# Patient Record
Sex: FEMALE | URBAN - METROPOLITAN AREA
[De-identification: names, ages, dates, MRNs, and addresses within clinical notes are randomized per-mention and may not be internally consistent; named-entity substitution may affect disease eponyms.]

---

## 2020-06-02 ENCOUNTER — APPOINTMENT (RX ONLY)
Dept: URBAN - METROPOLITAN AREA CLINIC 377 | Facility: CLINIC | Age: 55
Setting detail: DERMATOLOGY
End: 2020-06-02

## 2020-06-02 VITALS — TEMPERATURE: 96.9 F

## 2020-06-02 DIAGNOSIS — L40.8 OTHER PSORIASIS: ICD-10-CM

## 2020-06-02 PROCEDURE — ? PRESCRIPTION

## 2020-06-02 PROCEDURE — 99203 OFFICE O/P NEW LOW 30 MIN: CPT

## 2020-06-02 PROCEDURE — ? ADDITIONAL NOTES

## 2020-06-02 PROCEDURE — ? COUNSELING

## 2020-06-02 PROCEDURE — ? ORDER TESTS

## 2020-06-02 RX ORDER — KETOCONAZOLE 20 MG/G
CREAM TOPICAL
Qty: 2 | Refills: 3 | Status: ERX | COMMUNITY
Start: 2020-06-02

## 2020-06-02 RX ORDER — TRIAMCINOLONE ACETONIDE 1 MG/G
CREAM TOPICAL
Qty: 1 | Refills: 0 | Status: ERX | COMMUNITY
Start: 2020-06-02

## 2020-06-02 RX ADMIN — TRIAMCINOLONE ACETONIDE: 1 CREAM TOPICAL at 00:00

## 2020-06-02 RX ADMIN — KETOCONAZOLE: 20 CREAM TOPICAL at 00:00

## 2020-06-02 ASSESSMENT — LOCATION DETAILED DESCRIPTION DERM
LOCATION DETAILED: RIGHT RIB CAGE
LOCATION DETAILED: RIGHT ANTERIOR PROXIMAL THIGH
LOCATION DETAILED: RIGHT INFRAMAMMARY CREASE (INNER QUADRANT)
LOCATION DETAILED: LEFT ANTERIOR PROXIMAL THIGH
LOCATION DETAILED: LEFT MEDIAL BREAST 7-8:00 REGION
LOCATION DETAILED: RIGHT MEDIAL BREAST 4-5:00 REGION
LOCATION DETAILED: RIGHT BUTTOCK
LOCATION DETAILED: GENITALIA
LOCATION DETAILED: RIGHT INGUINAL CREASE
LOCATION DETAILED: LEFT RIB CAGE
LOCATION DETAILED: LEFT MEDIAL BREAST 8-9:00 REGION
LOCATION DETAILED: LEFT INFRAMAMMARY CREASE (INNER QUADRANT)

## 2020-06-02 ASSESSMENT — PGA PSORIASIS: PGA PSORIASIS 2020: MODERATE

## 2020-06-02 ASSESSMENT — LOCATION SIMPLE DESCRIPTION DERM
LOCATION SIMPLE: LEFT BREAST
LOCATION SIMPLE: ABDOMEN
LOCATION SIMPLE: LEFT THIGH
LOCATION SIMPLE: RIGHT THIGH
LOCATION SIMPLE: RIGHT BUTTOCK
LOCATION SIMPLE: GENITALIA
LOCATION SIMPLE: RIGHT BREAST
LOCATION SIMPLE: GROIN

## 2020-06-02 ASSESSMENT — LOCATION ZONE DERM
LOCATION ZONE: TRUNK
LOCATION ZONE: LEG

## 2020-06-02 ASSESSMENT — BSA PSORIASIS: % BODY COVERED IN PSORIASIS: 45

## 2020-06-02 NOTE — HPI: RASH
What Type Of Note Output Would You Prefer (Optional)?: Bullet Format
Is The Patient Presenting As Previously Scheduled?: Yes
How Severe Is Your Rash?: moderate
Is This A New Presentation, Or A Follow-Up?: Rash
Additional History: Pt states rash got worse in nursing home. Pt advises she has psoriasis.

## 2020-06-02 NOTE — PROCEDURE: ADDITIONAL NOTES
Detail Level: Simple
Additional Notes: Patient has history of NonHodgkins Lymphoma, is to consult with Oncologist regarding initiating biologic therapy. Cosentyx vs Humira vs other. Will start with topical therapy and recheck in 1 month. Patient to get labwork done prior to next appointment and have written consent from Oncologist regarding biologic therapy. All questions answered.

## 2021-06-08 ENCOUNTER — APPOINTMENT (RX ONLY)
Dept: URBAN - METROPOLITAN AREA CLINIC 174 | Facility: CLINIC | Age: 56
Setting detail: DERMATOLOGY
End: 2021-06-08

## 2021-06-08 DIAGNOSIS — L40.0 PSORIASIS VULGARIS: ICD-10-CM | Status: INADEQUATELY CONTROLLED

## 2021-06-08 PROCEDURE — ? PRESCRIPTION

## 2021-06-08 PROCEDURE — ? COUNSELING

## 2021-06-08 PROCEDURE — ? MEDICATION COUNSELING

## 2021-06-08 PROCEDURE — ? PRESCRIPTION MEDICATION MANAGEMENT

## 2021-06-08 PROCEDURE — 99214 OFFICE O/P EST MOD 30 MIN: CPT

## 2021-06-08 RX ORDER — FLUOCINONIDE 0.5 MG/G
CREAM TOPICAL
Qty: 1 | Refills: 3 | Status: ERX | COMMUNITY
Start: 2021-06-08

## 2021-06-08 RX ADMIN — FLUOCINONIDE: 0.5 CREAM TOPICAL at 00:00

## 2021-06-08 ASSESSMENT — LOCATION SIMPLE DESCRIPTION DERM
LOCATION SIMPLE: RIGHT BUTTOCK
LOCATION SIMPLE: RIGHT POSTERIOR THIGH
LOCATION SIMPLE: LEFT CALF
LOCATION SIMPLE: RIGHT BREAST
LOCATION SIMPLE: LEFT BREAST
LOCATION SIMPLE: LEFT UPPER BACK
LOCATION SIMPLE: LEFT PRETIBIAL REGION
LOCATION SIMPLE: RIGHT CALF
LOCATION SIMPLE: ABDOMEN
LOCATION SIMPLE: LEFT BUTTOCK
LOCATION SIMPLE: LEFT POSTERIOR THIGH

## 2021-06-08 ASSESSMENT — LOCATION DETAILED DESCRIPTION DERM
LOCATION DETAILED: LEFT MEDIAL UPPER BACK
LOCATION DETAILED: LEFT PROXIMAL PRETIBIAL REGION
LOCATION DETAILED: RIGHT DISTAL POSTERIOR THIGH
LOCATION DETAILED: LEFT MEDIAL BREAST 6-7:00 REGION
LOCATION DETAILED: RIGHT RIB CAGE
LOCATION DETAILED: RIGHT BUTTOCK
LOCATION DETAILED: PERIUMBILICAL SKIN
LOCATION DETAILED: LEFT PROXIMAL CALF
LOCATION DETAILED: RIGHT PROXIMAL CALF
LOCATION DETAILED: LEFT BUTTOCK
LOCATION DETAILED: LEFT LATERAL ABDOMEN
LOCATION DETAILED: LEFT RIB CAGE
LOCATION DETAILED: LEFT DISTAL POSTERIOR THIGH
LOCATION DETAILED: RIGHT MEDIAL BREAST 5-6:00 REGION

## 2021-06-08 ASSESSMENT — LOCATION ZONE DERM
LOCATION ZONE: TRUNK
LOCATION ZONE: LEG

## 2021-06-08 NOTE — PROCEDURE: MEDICATION COUNSELING
Xelanjaliz Pregnancy And Lactation Text: This medication is Pregnancy Category D and is not considered safe during pregnancy.  The risk during breast feeding is also uncertain.

## 2021-06-08 NOTE — PROCEDURE: MEDICATION COUNSELING
no previous reaction Xolair Pregnancy And Lactation Text: This medication is Pregnancy Category B and is considered safe during pregnancy. This medication is excreted in breast milk.

## 2021-06-08 NOTE — PROCEDURE: PRESCRIPTION MEDICATION MANAGEMENT
30-Jul-2020 10:15
Render In Strict Bullet Format?: No
Plan: - will use this sparingly-fluo\\n-culture if any worsening\\n-general lubrication advised\\n-pt will need to get letter from transplant team and onc before any systemic agents--she was informed
Detail Level: Zone

## 2023-04-02 ENCOUNTER — APPOINTMENT (OUTPATIENT)
Dept: GENERAL RADIOLOGY | Age: 58
End: 2023-04-02
Payer: COMMERCIAL

## 2023-04-02 ENCOUNTER — APPOINTMENT (OUTPATIENT)
Dept: CT IMAGING | Age: 58
End: 2023-04-02
Payer: COMMERCIAL

## 2023-04-02 ENCOUNTER — HOSPITAL ENCOUNTER (EMERGENCY)
Age: 58
Discharge: HOME | End: 2023-04-02
Attending: EMERGENCY MEDICINE
Payer: COMMERCIAL

## 2023-04-02 VITALS
OXYGEN SATURATION: 96 % | DIASTOLIC BLOOD PRESSURE: 68 MMHG | BODY MASS INDEX: 38.21 KG/M2 | WEIGHT: 258 LBS | HEIGHT: 69 IN | SYSTOLIC BLOOD PRESSURE: 131 MMHG | RESPIRATION RATE: 14 BRPM | TEMPERATURE: 97.6 F | HEART RATE: 89 BPM

## 2023-04-02 DIAGNOSIS — E83.42 HYPOMAGNESEMIA: ICD-10-CM

## 2023-04-02 DIAGNOSIS — N30.00 ACUTE CYSTITIS WITHOUT HEMATURIA: ICD-10-CM

## 2023-04-02 DIAGNOSIS — R41.82 ALTERED MENTAL STATUS, UNSPECIFIED ALTERED MENTAL STATUS TYPE: Primary | ICD-10-CM

## 2023-04-02 DIAGNOSIS — Z87.19 HISTORY OF CIRRHOSIS: ICD-10-CM

## 2023-04-02 DIAGNOSIS — Z66 DNR (DO NOT RESUSCITATE): ICD-10-CM

## 2023-04-02 LAB
ALBUMIN SERPL-MCNC: 1.8 GM/DL (ref 3.4–5)
ALP BLD-CCNC: 126 IU/L (ref 40–129)
ALT SERPL-CCNC: 33 U/L (ref 10–40)
AMMONIA: 136 UMOL/L (ref 11–51)
AMPHETAMINES: NEGATIVE
ANION GAP SERPL CALCULATED.3IONS-SCNC: 7 MMOL/L (ref 4–16)
AST SERPL-CCNC: 76 IU/L (ref 15–37)
BACTERIA: ABNORMAL /HPF
BARBITURATE SCREEN URINE: NEGATIVE
BASE EXCESS MIXED: 6.9 (ref 0–2.3)
BASE EXCESS: ABNORMAL (ref 0–2.4)
BASOPHILS ABSOLUTE: 0 K/CU MM
BASOPHILS RELATIVE PERCENT: 0.5 % (ref 0–1)
BENZODIAZEPINE SCREEN, URINE: NEGATIVE
BILIRUB SERPL-MCNC: 2.5 MG/DL (ref 0–1)
BILIRUBIN URINE: ABNORMAL MG/DL
BLOOD, URINE: ABNORMAL
BUN SERPL-MCNC: 11 MG/DL (ref 6–23)
CALCIUM SERPL-MCNC: 8.4 MG/DL (ref 8.3–10.6)
CANNABINOID SCREEN URINE: NEGATIVE
CAST TYPE: ABNORMAL /HPF
CHLORIDE BLD-SCNC: 100 MMOL/L (ref 99–110)
CLARITY: CLEAR
CO2 CONTENT: 31.9 MMOL/L (ref 19–24)
CO2: 30 MMOL/L (ref 21–32)
COCAINE METABOLITE: NEGATIVE
COLOR: YELLOW
COMMENT UA: ABNORMAL
CREAT SERPL-MCNC: 0.4 MG/DL (ref 0.6–1.1)
CRYSTAL TYPE: NEGATIVE /HPF
DIFFERENTIAL TYPE: ABNORMAL
EOSINOPHILS ABSOLUTE: 0.1 K/CU MM
EOSINOPHILS RELATIVE PERCENT: 1.5 % (ref 0–3)
EPITHELIAL CELLS, UA: 2 /HPF
GFR SERPL CREATININE-BSD FRML MDRD: >60 ML/MIN/1.73M2
GLUCOSE SERPL-MCNC: 199 MG/DL (ref 70–99)
GLUCOSE, URINE: NEGATIVE MG/DL
HCO3 VENOUS: 30.7 MMOL/L (ref 19–25)
HCT VFR BLD CALC: 36.8 % (ref 37–47)
HEMOGLOBIN: 12.1 GM/DL (ref 12.5–16)
IMMATURE NEUTROPHIL %: 0.2 % (ref 0–0.43)
KETONES, URINE: NEGATIVE MG/DL
LACTATE: 2.3 MMOL/L (ref 0.5–1.9)
LEUKOCYTE ESTERASE, URINE: ABNORMAL
LIPASE: 48 IU/L (ref 13–60)
LYMPHOCYTES ABSOLUTE: 1.3 K/CU MM
LYMPHOCYTES RELATIVE PERCENT: 31.5 % (ref 24–44)
MAGNESIUM: 1.4 MG/DL (ref 1.8–2.4)
MCH RBC QN AUTO: 32.1 PG (ref 27–31)
MCHC RBC AUTO-ENTMCNC: 32.9 % (ref 32–36)
MCV RBC AUTO: 97.6 FL (ref 78–100)
MONOCYTES ABSOLUTE: 0.6 K/CU MM
MONOCYTES RELATIVE PERCENT: 14.9 % (ref 0–4)
NITRITE URINE, QUANTITATIVE: POSITIVE
O2 SAT, VEN: 97.8 % (ref 50–70)
OPIATES, URINE: NEGATIVE
OXYCODONE: NEGATIVE
PCO2, VEN: 39.5 MMHG (ref 38–52)
PDW BLD-RTO: 19.2 % (ref 11.7–14.9)
PH VENOUS: 7.5 (ref 7.32–7.42)
PH, URINE: 7 (ref 5–8)
PHENCYCLIDINE, URINE: NEGATIVE
PLATELET # BLD: 70 K/CU MM (ref 140–440)
PMV BLD AUTO: 11.5 FL (ref 7.5–11.1)
PO2, VEN: 91.6 MMHG (ref 28–48)
POTASSIUM SERPL-SCNC: 4.6 MMOL/L (ref 3.5–5.1)
PROTEIN UA: NEGATIVE MG/DL
RBC # BLD: 3.77 M/CU MM (ref 4.2–5.4)
RBC URINE: 5 /HPF (ref 0–6)
SEGMENTED NEUTROPHILS ABSOLUTE COUNT: 2.1 K/CU MM
SEGMENTED NEUTROPHILS RELATIVE PERCENT: 51.4 % (ref 36–66)
SODIUM BLD-SCNC: 137 MMOL/L (ref 135–145)
SOURCE, BLOOD GAS: ABNORMAL
SPECIFIC GRAVITY UA: 1.01 (ref 1–1.03)
TOTAL IMMATURE NEUTOROPHIL: 0.01 K/CU MM
TOTAL PROTEIN: 7 GM/DL (ref 6.4–8.2)
TSH SERPL DL<=0.005 MIU/L-ACNC: 1.98 UIU/ML (ref 0.27–4.2)
UROBILINOGEN, URINE: >8 MG/DL (ref 0.2–1)
WBC # BLD: 4.1 K/CU MM (ref 4–10.5)
WBC UA: 11 /HPF (ref 0–5)

## 2023-04-02 PROCEDURE — 96366 THER/PROPH/DIAG IV INF ADDON: CPT

## 2023-04-02 PROCEDURE — 96368 THER/DIAG CONCURRENT INF: CPT

## 2023-04-02 PROCEDURE — 70450 CT HEAD/BRAIN W/O DYE: CPT

## 2023-04-02 PROCEDURE — 83605 ASSAY OF LACTIC ACID: CPT

## 2023-04-02 PROCEDURE — 80053 COMPREHEN METABOLIC PANEL: CPT

## 2023-04-02 PROCEDURE — 2580000003 HC RX 258: Performed by: EMERGENCY MEDICINE

## 2023-04-02 PROCEDURE — 81001 URINALYSIS AUTO W/SCOPE: CPT

## 2023-04-02 PROCEDURE — 74176 CT ABD & PELVIS W/O CONTRAST: CPT

## 2023-04-02 PROCEDURE — 6360000002 HC RX W HCPCS: Performed by: EMERGENCY MEDICINE

## 2023-04-02 PROCEDURE — 84443 ASSAY THYROID STIM HORMONE: CPT

## 2023-04-02 PROCEDURE — 71045 X-RAY EXAM CHEST 1 VIEW: CPT

## 2023-04-02 PROCEDURE — 80307 DRUG TEST PRSMV CHEM ANLYZR: CPT

## 2023-04-02 PROCEDURE — 96365 THER/PROPH/DIAG IV INF INIT: CPT

## 2023-04-02 PROCEDURE — 83690 ASSAY OF LIPASE: CPT

## 2023-04-02 PROCEDURE — 83735 ASSAY OF MAGNESIUM: CPT

## 2023-04-02 PROCEDURE — 99285 EMERGENCY DEPT VISIT HI MDM: CPT

## 2023-04-02 PROCEDURE — 82140 ASSAY OF AMMONIA: CPT

## 2023-04-02 PROCEDURE — 93005 ELECTROCARDIOGRAM TRACING: CPT | Performed by: EMERGENCY MEDICINE

## 2023-04-02 PROCEDURE — 82805 BLOOD GASES W/O2 SATURATION: CPT

## 2023-04-02 PROCEDURE — 85025 COMPLETE CBC W/AUTO DIFF WBC: CPT

## 2023-04-02 RX ORDER — SPIRONOLACTONE 25 MG/1
25 TABLET ORAL DAILY
COMMUNITY

## 2023-04-02 RX ORDER — ESCITALOPRAM OXALATE 10 MG/1
10 TABLET ORAL DAILY
COMMUNITY

## 2023-04-02 RX ORDER — IBUPROFEN 600 MG/1
TABLET ORAL
COMMUNITY

## 2023-04-02 RX ORDER — LACTULOSE 10 G/15ML
30 SOLUTION ORAL ONCE
Status: DISCONTINUED | OUTPATIENT
Start: 2023-04-02 | End: 2023-04-02 | Stop reason: HOSPADM

## 2023-04-02 RX ORDER — FERROUS SULFATE 325(65) MG
325 TABLET ORAL
COMMUNITY

## 2023-04-02 RX ORDER — LACTULOSE 20 G/30ML
90 SOLUTION ORAL 4 TIMES DAILY
COMMUNITY

## 2023-04-02 RX ORDER — CEPHALEXIN 500 MG/1
500 CAPSULE ORAL 2 TIMES DAILY
Qty: 14 CAPSULE | Refills: 0 | Status: SHIPPED | OUTPATIENT
Start: 2023-04-02 | End: 2023-04-09

## 2023-04-02 RX ORDER — FUROSEMIDE 40 MG/1
40 TABLET ORAL 2 TIMES DAILY
COMMUNITY

## 2023-04-02 RX ORDER — PANTOPRAZOLE SODIUM 40 MG/1
40 TABLET, DELAYED RELEASE ORAL DAILY
COMMUNITY

## 2023-04-02 RX ORDER — TRIAMCINOLONE ACETONIDE 1 MG/G
CREAM TOPICAL 2 TIMES DAILY
COMMUNITY

## 2023-04-02 RX ORDER — INSULIN GLARGINE 100 [IU]/ML
27 INJECTION, SOLUTION SUBCUTANEOUS 2 TIMES DAILY
COMMUNITY

## 2023-04-02 RX ORDER — POTASSIUM CHLORIDE 1500 MG/1
40 TABLET, FILM COATED, EXTENDED RELEASE ORAL
COMMUNITY
Start: 2023-04-01 | End: 2023-04-03

## 2023-04-02 RX ORDER — MAGNESIUM SULFATE IN WATER 40 MG/ML
2000 INJECTION, SOLUTION INTRAVENOUS ONCE
Status: COMPLETED | OUTPATIENT
Start: 2023-04-02 | End: 2023-04-02

## 2023-04-02 RX ORDER — BUSPIRONE HYDROCHLORIDE 10 MG/1
10 TABLET ORAL 2 TIMES DAILY
COMMUNITY

## 2023-04-02 RX ADMIN — MAGNESIUM SULFATE HEPTAHYDRATE 2000 MG: 2 INJECTION, SOLUTION INTRAVENOUS at 20:12

## 2023-04-02 RX ADMIN — CEFTRIAXONE SODIUM 1000 MG: 1 INJECTION, POWDER, FOR SOLUTION INTRAMUSCULAR; INTRAVENOUS at 19:42

## 2023-04-02 ASSESSMENT — ENCOUNTER SYMPTOMS
ABDOMINAL PAIN: 1
NAUSEA: 1
ALLERGIC/IMMUNOLOGIC NEGATIVE: 1
RESPIRATORY NEGATIVE: 1
EYES NEGATIVE: 1
VOMITING: 1

## 2023-04-02 NOTE — ED PROVIDER NOTES
Emergency Department Encounter  Location: [address] [place] [address]    Patient: Ailyn Hyman  MRN: [id number]  : 1965  Date of evaluation: 2023  ED Provider: Lanette Cockayne, MD    19:00p.m. Ailyn Hyman was checked out to me by Dr. Kiara Lopes. Please see his/her initial documentation for details of the patient's initial ED presentation, physical exam and completed studies. From HPI:   In brief, Ailyn Hyman is a 63 y.o. female that presented to the emergency department for evaluation of reported change mental status. According the patient she was that she been usual state of health until last 48 hours. She says that she is been feeling like she has been having foggy headed. Patient denies chest pain, shortness breath, abdominal pain, fevers, change bowel or bladder habits and no recent changes of diet or medications. She says symptoms have been ongoing for the past 2 days. She states that her POA told her that she needed to go to the emergency department for evaluation. Patient says she has no current complaints at this time but that does report that her symptoms resolved after arrival here in the ED. Physical Exam:  General: Well-appearing well-nourished female in no acute distress  HEENT: Pupils equal round reactive light, moist mucous membranes.   Extraocular movements intact  Neck: Supple, no meningismus  Chest: Regular rate rhythm no murmurs rubs or gallops  Abdomen: Soft nontender nondistended no tenderness in right upper quadrant no pulsatile masses to deep palpation  Lungs: Clear to auscultation bilaterally no wheeze rales rhonchi or stridor  Extremities: No unilateral leg swelling no obvious deformities or dislocations  Integument: No apparent rashes erythema contusions or petechiae  Neurologic: Cranial nerves II through XII grossly intact  Psychiatric: Alert oriented x2, not oriented to date    I have reviewed and interpreted all of the currently available lab results
such as UTI or pneumonia is warranted also we will check her ammonia level and labs and imaging. Currently I am at the end of my shift I will sign patient out to Dr. Francesca Morley who will follow up with labs imaging disposition. Patient likely go back to nursing home after work appears completed. She is a DNR CC.     CLINICAL IMPRESSION:  Final diagnoses:    Altered mental status, unspecified altered mental status type   DNR (do not resuscitate)   History of cirrhosis   Acute cystitis without hematuria   Hypomagnesemia       (Please note that portions of this note may have been completed with a voice recognition program. Efforts were made to edit the dictations but occasionally words aremis-transcribed.)    DISPOSITION REFERRAL (if applicable):  Sera Parrish  [address] [place] [address]  [phone]    Schedule an appointment as soon as possible for a visit in 1 day      [address]  [phone]    If symptoms worsen      DISPOSITION MEDICATIONS (if applicable):  Discharge Medication List as of 4/2/2023  9:28 PM        START taking these medications    Details   cephALEXin (KEFLEX) 500 MG capsule Take 1 capsule by mouth 2 times daily for 7 days, Disp-14 capsule, R-0Print                Melvena Dux, DO           Melvena Dux, DO  04/03/23 1602

## 2023-04-02 NOTE — DISCHARGE INSTRUCTIONS
Take the full course of antibiotics. Please follow-up with primary care physician or referral physician next 24 to 48 hours. Call to schedule appointment.     Return to the emergency department for chest pain, Shortness of Breath, Fevers, Inability Tolerating by Mouth, Any Other concerning Symptoms

## 2023-04-02 NOTE — ED TRIAGE NOTES
Pt arrives from [address] for \"being lethargic\" earlier today according to the patients DPOA who saw her and said she was being lethargic. The patient is not wanting to be here and have test done, she is answering questions appropriately. She is a DNR-CC.

## 2023-04-02 NOTE — ED NOTES
Called and obtain verbal consent to treat from Bakari Huddleston [facility]) at 18:28 via [phone]     Wayne Menchaca  04/02/23 4436

## 2023-04-02 NOTE — ED NOTES
Spoke with Thaddeus Concepcion, the patients DP for healthcare. The patient did state that she is her POA and understands what this means and also that she is a DNR-CC. She said that her friend thought it would be best to send her to the hospital. She that is why she is here. When I spoke with Thaddeus Concepcion, she said that the patient told her that she wanted to go to the hospital and she apparently told the staff that on Friday as well. The patient states that she just wanted to go to the hospital in order to get the lactulose by NG tube and get knocked out first. The patients friend and Dafne Vo said she really just did not know what to do. She reported that the patient makes all of her decisions, signs for herself legally. I explained that the only way to determine what the patient needs, we need to do some test, such as draw blood and do a ct scan along with other things. The patient expressed to me that she thought it was not necessary and that the nursing home already gave her lactulose today, rectally, which is how she takes it. She will not drink it, \"it makes me sick\". \"I either drink it or take it up the ass\". She expressed to me that she had to urinate. I offered her the bedpan, she said that the staff was supposed to come back after the lactulose but did not. She uses an attends, I prepared to get her cleaned up and an attends under her when she asked me to dial a number for here first, I dialed her friends number and she is talking to her at this time.  ( A different friendRobinson Najjar, RN  04/02/23 9503

## 2023-04-03 LAB
EKG ATRIAL RATE: 88 BPM
EKG DIAGNOSIS: NORMAL
EKG P AXIS: 58 DEGREES
EKG P-R INTERVAL: 172 MS
EKG Q-T INTERVAL: 402 MS
EKG QRS DURATION: 114 MS
EKG QTC CALCULATION (BAZETT): 486 MS
EKG R AXIS: -41 DEGREES
EKG T AXIS: 6 DEGREES
EKG VENTRICULAR RATE: 88 BPM

## 2023-04-03 PROCEDURE — 93010 ELECTROCARDIOGRAM REPORT: CPT | Performed by: INTERNAL MEDICINE

## 2023-06-21 ENCOUNTER — APPOINTMENT (OUTPATIENT)
Dept: GENERAL RADIOLOGY | Age: 58
End: 2023-06-21
Payer: COMMERCIAL

## 2023-06-21 ENCOUNTER — HOSPITAL ENCOUNTER (EMERGENCY)
Age: 58
Discharge: NURSING, REHAB OR LONG-TERM CARE | End: 2023-06-21
Attending: EMERGENCY MEDICINE
Payer: COMMERCIAL

## 2023-06-21 VITALS
SYSTOLIC BLOOD PRESSURE: 138 MMHG | OXYGEN SATURATION: 96 % | HEART RATE: 91 BPM | WEIGHT: 260.44 LBS | DIASTOLIC BLOOD PRESSURE: 89 MMHG | BODY MASS INDEX: 38.57 KG/M2 | HEIGHT: 69 IN | TEMPERATURE: 98.1 F | RESPIRATION RATE: 22 BRPM

## 2023-06-21 DIAGNOSIS — S91.114A LACERATION OF FOURTH TOE OF RIGHT FOOT, INITIAL ENCOUNTER: Primary | ICD-10-CM

## 2023-06-21 PROCEDURE — 99283 EMERGENCY DEPT VISIT LOW MDM: CPT

## 2023-06-21 PROCEDURE — 73630 X-RAY EXAM OF FOOT: CPT

## 2023-06-21 PROCEDURE — 12001 RPR S/N/AX/GEN/TRNK 2.5CM/<: CPT

## 2023-06-21 RX ORDER — LORAZEPAM 0.5 MG/1
0.5 TABLET ORAL 2 TIMES DAILY
COMMUNITY
Start: 2021-07-25

## 2023-06-21 RX ORDER — ONDANSETRON 4 MG/1
4 TABLET, FILM COATED ORAL EVERY 8 HOURS PRN
COMMUNITY

## 2023-06-21 RX ORDER — PROMETHAZINE HYDROCHLORIDE 25 MG/1
25 TABLET ORAL EVERY 12 HOURS PRN
COMMUNITY

## 2023-06-21 RX ORDER — KETOCONAZOLE 20 MG/G
CREAM TOPICAL
COMMUNITY
Start: 2023-06-13

## 2023-06-21 RX ORDER — OXYCODONE HYDROCHLORIDE 5 MG/1
5 CAPSULE ORAL
COMMUNITY

## 2023-06-21 ASSESSMENT — LIFESTYLE VARIABLES
HOW MANY STANDARD DRINKS CONTAINING ALCOHOL DO YOU HAVE ON A TYPICAL DAY: PATIENT DOES NOT DRINK
HOW OFTEN DO YOU HAVE A DRINK CONTAINING ALCOHOL: NEVER

## 2023-06-21 ASSESSMENT — PAIN - FUNCTIONAL ASSESSMENT: PAIN_FUNCTIONAL_ASSESSMENT: NONE - DENIES PAIN

## 2023-06-21 NOTE — ED NOTES
Lalitha Palma with [facility] called in report. Pt was laid in her bed by a STNA and her \"leg\" was dropped in the bed and her toe was injured by the bed. Aprox 2cm lac to posterior rt foot 4th toe. To ed with no active bleeding with foot wrapped in gauze. Per [facility] pt was given percocet at 11am and ativan at 1300. Pt is able to swallow pills and at baseline is mildly confused.       Stef Beal RN  06/21/23 1600

## 2023-06-21 NOTE — ED NOTES
Attempted to call report to [facility] unable to be connected to the nursing station was told they were busy and [facility] us back[phone]  Also requested in the future to have report called prior to pt arrival.      Hanna Plascencia RN  06/21/23 9234

## 2023-06-21 NOTE — DISCHARGE INSTRUCTIONS
Follow-up with primary care physician 2 days for wound check and suture removal in 12 to 14 days. Return to the emergency department any signs of infection pus drainage discharge bleeding or any worsening symptoms.

## 2023-06-21 NOTE — ED NOTES
Attempted to call [facility] for report on patient. When phone was answered, call was disconnected X2.        Gardenia Mac RN  06/21/23 8052

## 2023-06-21 NOTE — ED NOTES
Assisted Dr Leslee Rizvi with holding pt during sutures, pt tolerated well. Wrapped 4th toe of rt foot with surgicel , 2x2 and coban.       Ata Tran RN  06/21/23 2186

## 2023-06-21 NOTE — ED NOTES
M[facility] Bennett with [facility] called back for report. Summary of care was reviewed with her.       Debo Mayorga RN  06/21/23 3463

## 2023-06-21 NOTE — ED TRIAGE NOTES
Pt from from [facility] via [address] [place] [address] EMS she has a lac on right foot. DNR-CC.

## 2023-06-21 NOTE — ED NOTES
Handoff given to [facility] transport team. Pt stable at time of transport.       Cristin Eduardo RN  06/21/23 4885

## 2023-06-21 NOTE — ED NOTES
Called Dg to give report for patient's return. Accepting RN unable to take report at this time. Name and call back number provided.       Rene Bryan RN  06/21/23 0579

## 2023-06-21 NOTE — ED PROVIDER NOTES
Emergency Department Encounter    Patient: Donna Lau  MRN: [id number]  : 1965  Date of Evaluation: 2023  ED Provider:  Keerthi Martin DO    Triage Chief Complaint:   Laceration (Pt from from [de-identified] ECF via [address] [place] [address] EMS she has a lac on right foot. DNR-CC. )    Jamul:  Donna Lau is a 63 y.o. female with history of hepatic failure, weakness, congestive heart failure, depression, hypertension, acid reflux, hyperlipidemia, chronic pain, dysphagia, pancreatic cancer, HUANG, diabetes mellitus, hypertensive retinopathy, dry eye syndrome, COPD, anxiety, cirrhosis of liver, emphysema that presents via EMS from the [facility] for laceration on the right foot. Per report patient got her right foot caught on the bed laceration across the plantar aspect of the fourth digit of the right foot. They state patient is also DNR CC and is at her baseline. Speak with patient she states she is having some right foot pain. She states she did cut her foot on the bed. Patient states she is up-to-date on tetanus. Per report patient did get Ativan at 1:00 PM and her oxycodone at 11:00 AM.  Patient denies any other chest pain short of breath fever chills cough sore throat runny nose earache. Patient denies any fall or head trauma. Patient here for evaluation.     ROS - see HPI, below listed is current ROS at time of my eval:  General:  No fevers, no chills, no weakness  Eyes:  No recent vison changes, no discharge  ENT:  No sore throat, no nasal congestion, no hearing changes  Cardiovascular:  No chest pain, no palpitations  Respiratory:  No shortness of breath, no cough, no wheezing  Gastrointestinal:  No pain, no nausea, no vomiting, no diarrhea  Musculoskeletal:  No muscle pain, no joint pain  Skin:  No rash, no pruritis, no easy bruising  Neurologic:  No speech problems, no headache, no extremity numbness, no extremity tingling, no extremity weakness  Psychiatric:  No